# Patient Record
Sex: FEMALE | Race: WHITE
[De-identification: names, ages, dates, MRNs, and addresses within clinical notes are randomized per-mention and may not be internally consistent; named-entity substitution may affect disease eponyms.]

---

## 2020-05-06 ENCOUNTER — HOSPITAL ENCOUNTER (OUTPATIENT)
Dept: HOSPITAL 53 - M LAB REF | Age: 70
End: 2020-05-06
Attending: DERMATOLOGY
Payer: MEDICARE

## 2020-05-06 DIAGNOSIS — C44.310: Primary | ICD-10-CM

## 2020-05-06 DIAGNOSIS — L57.8: ICD-10-CM

## 2020-08-27 ENCOUNTER — HOSPITAL ENCOUNTER (OUTPATIENT)
Dept: HOSPITAL 53 - M LAB REF | Age: 70
End: 2020-08-27
Attending: DERMATOLOGY
Payer: MEDICARE

## 2020-08-27 DIAGNOSIS — C44.319: Primary | ICD-10-CM

## 2020-11-24 ENCOUNTER — HOSPITAL ENCOUNTER (OUTPATIENT)
Dept: HOSPITAL 53 - M SFHCRHEU | Age: 70
End: 2020-11-24
Attending: INTERNAL MEDICINE
Payer: MEDICARE

## 2020-11-24 ENCOUNTER — HOSPITAL ENCOUNTER (OUTPATIENT)
Dept: HOSPITAL 53 - M PLAIMG | Age: 70
End: 2020-11-24
Attending: INTERNAL MEDICINE
Payer: MEDICARE

## 2020-11-24 DIAGNOSIS — M19.042: ICD-10-CM

## 2020-11-24 DIAGNOSIS — M17.0: ICD-10-CM

## 2020-11-24 DIAGNOSIS — M51.37: ICD-10-CM

## 2020-11-24 DIAGNOSIS — M51.36: ICD-10-CM

## 2020-11-24 DIAGNOSIS — M25.50: ICD-10-CM

## 2020-11-24 DIAGNOSIS — M16.0: ICD-10-CM

## 2020-11-24 DIAGNOSIS — R76.8: Primary | ICD-10-CM

## 2020-11-24 DIAGNOSIS — M19.041: Primary | ICD-10-CM

## 2020-11-24 DIAGNOSIS — M25.462: ICD-10-CM

## 2020-11-24 LAB
C3 SERPL-MCNC: 154 MG/DL (ref 90–180)
C4 SERPL-MCNC: 24 MG/DL (ref 10–40)
CK SERPL-CCNC: 27 U/L (ref 26–192)
CRP SERPL-MCNC: 0.6 MG/DL (ref 0–0.3)
RHEUMATOID FACT SERPL-ACNC: < 10 IU/ML (ref ?–15)

## 2020-11-24 PROCEDURE — 73130 X-RAY EXAM OF HAND: CPT

## 2020-11-24 PROCEDURE — 86431 RHEUMATOID FACTOR QUANT: CPT

## 2020-11-24 PROCEDURE — 72202 X-RAY EXAM SI JOINTS 3/> VWS: CPT

## 2020-11-24 PROCEDURE — 73564 X-RAY EXAM KNEE 4 OR MORE: CPT

## 2020-11-24 PROCEDURE — 72110 X-RAY EXAM L-2 SPINE 4/>VWS: CPT

## 2020-11-24 PROCEDURE — 85652 RBC SED RATE AUTOMATED: CPT

## 2020-11-24 PROCEDURE — 73502 X-RAY EXAM HIP UNI 2-3 VIEWS: CPT

## 2020-11-24 PROCEDURE — 86225 DNA ANTIBODY NATIVE: CPT

## 2020-11-24 PROCEDURE — 86255 FLUORESCENT ANTIBODY SCREEN: CPT

## 2020-11-24 PROCEDURE — 86235 NUCLEAR ANTIGEN ANTIBODY: CPT

## 2020-11-24 PROCEDURE — 86200 CCP ANTIBODY: CPT

## 2020-11-24 PROCEDURE — 86140 C-REACTIVE PROTEIN: CPT

## 2020-11-24 PROCEDURE — 86038 ANTINUCLEAR ANTIBODIES: CPT

## 2020-11-24 PROCEDURE — 86160 COMPLEMENT ANTIGEN: CPT

## 2020-11-24 PROCEDURE — 82550 ASSAY OF CK (CPK): CPT

## 2020-11-24 NOTE — REPPI
INDICATION:

M25.50 POLYARTHRALGIA



COMPARISON:

None.



TECHNIQUE:

Five views of bilateral knees performed.



FINDINGS:

There is no evidence of acute fracture, dislocation, or intrinsic bone disease.On the

left there is medial joint space narrowing and subchondral sclerosis of a mild degree.

There is mild diffuse spurring.  There is a moderate-sized spur at the posterior

tibial plateau.  There is a small joint effusion.  There is moderate spurring of the

medial and lateral patellar facets.  There is an oval calcific body at the lateral

margin of the patellofemoral joint measuring 8 x 3 mm.  There is mild narrowing and

subchondral sclerosis of the lateral patellofemoral joint.



On the low right there is moderate medial joint space narrowing with subchondral

sclerosis.  There is moderate diffuse spurring.  There does not appear to be

significant joint effusion.



IMPRESSION:

No fracture or dislocation. Moderate bilateral arthritic changes as discussed in

detail above.





<Electronically signed by Brad Lane > 11/24/20 [id number]

## 2020-11-24 NOTE — REPPI
INDICATION:

M25.50 POLYARTHRALGIA



COMPARISON:

None.



TECHNIQUE:

AP, lateral, bilateral oblique views right and left hand.



FINDINGS:

Right hand demonstrates moderate arthritic changes involving the interphalangeal

joints including subchondral sclerosis, joint space narrowing, and marginal spurring

primarily involving the 1st interphalangeal joint, 2nd proximal and distal

interphalangeal joints, 3rd distal interphalangeal joint and 4th proximal

interphalangeal joint.  No periarticular lucencies, significant swelling, or loose

bodies are identified.  No evidence for acute or healed injury.



Left hand demonstrates mild to moderate arthritic changes involving the

interphalangeal joints with subchondral sclerosis and mild joint space narrowing.

There is prior fixation at the 1st interphalangeal joint.  No periarticular lucencies,

significant swelling or loose bodies are identified.



IMPRESSION:

Mild to moderate arthritic changes primarily involving the bilateral interphalangeal

joints (right greater than left).  Prior surgical intervention and fixation at the

left 1st interphalangeal joint noted.  No acute fracture or dislocation.





<Electronically signed by Nemesio Nieves > 11/24/20 [id number]

## 2020-11-24 NOTE — REPPI
INDICATION:

M25.50 POLYARTHRALGIA.



COMPARISON:

None.



TECHNIQUE:

AP and frogleg views of bilateral hips performed.



FINDINGS:

No fracture or dislocation is seen.  There is mild joint space narrowing, subchondral

sclerosis and spurring at both hip joints in a fairly symmetrical fashion.  Mild

tendinous calcifications are seen along the right ischial tuberosity.  There are also

mild tendinous calcifications at the greater trochanters bilaterally.



IMPRESSION:

Mild bilateral arthritic changes symmetrically.





<Electronically signed by Brad Lane > 11/24/20 [id number]

## 2020-11-24 NOTE — REPPI
INDICATION:

M25.50 POLYARTHRALGIA



COMPARISON:

None.



TECHNIQUE:

AP, lateral, bilateral oblique, and coned-down views of the lumbar spine.



FINDINGS:

Early advanced degenerative changes at L4-5 and L5-S1 noted with moderate multilevel

degenerative changes throughout the remainder of the lumbar spine.  There is chronic

grade 1 anterolisthesis at the L4-5 level of approximately 5 mm with endplate

sclerosis and hypertrophic facet changes.  There is endplate sclerosis with disc space

narrowing and hypertrophic facet changes at the L5-S1 level.  Advanced degenerative

changes are also identified at the T11-12 level with endplate sclerosis, bridging

osteophyte and disc space narrowing.



IMPRESSION:

Areas of degenerative spondylosis as described above.  No acute fracture/compression

injury.





<Electronically signed by Nemesio Nieves > 11/24/20 [id number]

## 2020-11-24 NOTE — REPPI
INDICATION:

M25.50 POLYARTHRALGIA.



COMPARISON:

None.



TECHNIQUE:

Four views of the bilateral sacroiliac joints.



FINDINGS:

The bilateral sacroiliac joints are symmetric and essentially age-appropriate.  No

significant periarticular sclerosis or fusion.  No evidence for subluxation.

Surrounding osseous structures demonstrate age-related degenerative changes.



IMPRESSION:

Symmetric age-related changes to the bilateral sacroiliac joints.





<Electronically signed by Nemesio Nieves > 11/24/20 [id number]

## 2024-12-16 ENCOUNTER — HOSPITAL ENCOUNTER (OUTPATIENT)
Dept: HOSPITAL 53 - M LAB REF | Age: 74
End: 2024-12-16
Attending: PHYSICIAN ASSISTANT
Payer: MEDICARE

## 2024-12-16 DIAGNOSIS — R73.9: ICD-10-CM

## 2024-12-16 DIAGNOSIS — E78.00: ICD-10-CM

## 2024-12-16 DIAGNOSIS — L20.9: Primary | ICD-10-CM

## 2024-12-16 DIAGNOSIS — E78.5: ICD-10-CM

## 2024-12-16 DIAGNOSIS — R25.2: ICD-10-CM

## 2024-12-16 DIAGNOSIS — I10: ICD-10-CM

## 2024-12-16 DIAGNOSIS — E03.8: ICD-10-CM

## 2024-12-16 LAB
ALBUMIN SERPL BCG-MCNC: 3.5 G/DL (ref 3.2–5.2)
ALP SERPL-CCNC: 124 U/L (ref 35–104)
ALT SERPL W P-5'-P-CCNC: 28 U/L (ref 7–40)
AST SERPL-CCNC: 29 U/L (ref ?–34)
BASOPHILS # BLD AUTO: 0 10^3/UL (ref 0–0.2)
BASOPHILS NFR BLD AUTO: 1 % (ref 0–1)
BILIRUB SERPL-MCNC: 0.7 MG/DL (ref 0.3–1.2)
BUN SERPL-MCNC: 19 MG/DL (ref 9–23)
CALCIUM SERPL-MCNC: 9.5 MG/DL (ref 8.3–10.6)
CHLORIDE SERPL-SCNC: 106 MMOL/L (ref 98–107)
CHOLEST SERPL-MCNC: 185 MG/DL (ref ?–200)
CHOLEST/HDLC SERPL: 4.4 {RATIO} (ref ?–5)
CO2 SERPL-SCNC: 28 MMOL/L (ref 20–31)
CREAT SERPL-MCNC: 0.85 MG/DL (ref 0.55–1.3)
EOSINOPHIL # BLD AUTO: 0.1 10^3/UL (ref 0–0.5)
EOSINOPHIL NFR BLD AUTO: 2.2 % (ref 0–3)
EST. AVERAGE GLUCOSE BLD GHB EST-MCNC: 108 MG/DL (ref 60–110)
GFR SERPL CREATININE-BSD FRML MDRD: > 60 ML/MIN/{1.73_M2} (ref 39–?)
GLUCOSE SERPL-MCNC: 114 MG/DL (ref 74–106)
HCT VFR BLD AUTO: 39 % (ref 36–47)
HDLC SERPL-MCNC: 42 MG/DL (ref 40–?)
HGB BLD-MCNC: 13.2 G/DL (ref 12–15.5)
IRON SATN MFR SERPL: 27.9 % (ref 13.2–45)
IRON SERPL-MCNC: 83 UG/DL (ref 50–170)
LDLC SERPL CALC-MCNC: 112.4 MG/DL (ref ?–100)
LYMPHOCYTES # BLD AUTO: 0.9 10^3/UL (ref 1.5–5)
LYMPHOCYTES NFR BLD AUTO: 29.3 % (ref 24–44)
MCH RBC QN AUTO: 30.5 PG (ref 27–33)
MCHC RBC AUTO-ENTMCNC: 33.8 G/DL (ref 32–36.5)
MCV RBC AUTO: 90.1 FL (ref 80–96)
MONOCYTES # BLD AUTO: 0.3 10^3/UL (ref 0–0.8)
MONOCYTES NFR BLD AUTO: 10.5 % (ref 2–8)
NEUTROPHILS # BLD AUTO: 1.8 10^3/UL (ref 1.5–8.5)
NEUTROPHILS NFR BLD AUTO: 56.7 % (ref 36–66)
NONHDLC SERPL-MCNC: 143 MG/DL
PLATELET # BLD AUTO: 116 10^3/UL (ref 150–450)
POTASSIUM SERPL-SCNC: 4 MMOL/L (ref 3.5–5.1)
PROT SERPL-MCNC: 7 G/DL (ref 5.7–8.2)
RBC # BLD AUTO: 4.33 10^6/UL (ref 4–5.4)
SODIUM SERPL-SCNC: 143 MMOL/L (ref 136–145)
T4 FREE SERPL-MCNC: 1.17 NG/DL (ref 0.89–1.76)
TIBC SERPL-MCNC: 297 UG/DL (ref 250–425)
TRIGL SERPL-MCNC: 153 MG/DL (ref ?–150)
TSH SERPL DL<=0.005 MIU/L-ACNC: 1.44 UIU/ML (ref 0.55–4.78)
VIT B12 SERPL-MCNC: 465 PG/ML (ref 211–911)
WBC # BLD AUTO: 3.1 10^3/UL (ref 4–10)